# Patient Record
Sex: MALE | Race: ASIAN | NOT HISPANIC OR LATINO
[De-identification: names, ages, dates, MRNs, and addresses within clinical notes are randomized per-mention and may not be internally consistent; named-entity substitution may affect disease eponyms.]

---

## 2019-03-06 ENCOUNTER — APPOINTMENT (OUTPATIENT)
Dept: CARDIOLOGY | Facility: CLINIC | Age: 39
End: 2019-03-06
Payer: MEDICAID

## 2019-03-06 PROCEDURE — 93000 ELECTROCARDIOGRAM COMPLETE: CPT

## 2019-03-06 PROCEDURE — 99214 OFFICE O/P EST MOD 30 MIN: CPT

## 2019-03-08 ENCOUNTER — APPOINTMENT (OUTPATIENT)
Dept: CARDIOLOGY | Facility: CLINIC | Age: 39
End: 2019-03-08
Payer: MEDICAID

## 2019-03-08 DIAGNOSIS — R94.31 ABNORMAL ELECTROCARDIOGRAM [ECG] [EKG]: ICD-10-CM

## 2019-03-08 PROCEDURE — 93306 TTE W/DOPPLER COMPLETE: CPT

## 2019-03-10 NOTE — REVIEW OF SYSTEMS
[Recent Weight Gain (___ Lbs)] : recent [unfilled] ~Ulb weight gain [Dizziness] : dizziness [Negative] : Heme/Lymph

## 2019-03-10 NOTE — HISTORY OF PRESENT ILLNESS
[FreeTextEntry1] : Mr. Patten is 38 years of age with overweight White BMI, dyslipidemia, hypertension and comes today for cardiac followup now with complaints of dizziness. He will be starting antihypertensive medication more regularly shortly. He is here for checkup. No current chest pains, shortness of breath, syncope or edema.

## 2019-03-10 NOTE — PHYSICAL EXAM
[General Appearance - Well Developed] : well developed [Normal Appearance] : normal appearance [Well Groomed] : well groomed [General Appearance - Well Nourished] : well nourished [No Deformities] : no deformities [General Appearance - In No Acute Distress] : no acute distress [Normal Conjunctiva] : the conjunctiva exhibited no abnormalities [Eyelids - No Xanthelasma] : the eyelids demonstrated no xanthelasmas [Normal Oral Mucosa] : normal oral mucosa [No Oral Pallor] : no oral pallor [No Oral Cyanosis] : no oral cyanosis [Normal Jugular Venous A Waves Present] : normal jugular venous A waves present [Normal Jugular Venous V Waves Present] : normal jugular venous V waves present [No Jugular Venous Aldrich A Waves] : no jugular venous aldrich A waves [Respiration, Rhythm And Depth] : normal respiratory rhythm and effort [Exaggerated Use Of Accessory Muscles For Inspiration] : no accessory muscle use [Auscultation Breath Sounds / Voice Sounds] : lungs were clear to auscultation bilaterally [Bowel Sounds] : normal bowel sounds [Abdomen Soft] : soft [Abdomen Tenderness] : non-tender [Abnormal Walk] : normal gait [Nail Clubbing] : no clubbing of the fingernails [Cyanosis, Localized] : no localized cyanosis [Petechial Hemorrhages (___cm)] : no petechial hemorrhages [Skin Color & Pigmentation] : normal skin color and pigmentation [] : no rash [No Venous Stasis] : no venous stasis [Skin Lesions] : no skin lesions [No Skin Ulcers] : no skin ulcer [No Xanthoma] : no  xanthoma was observed [Oriented To Time, Place, And Person] : oriented to person, place, and time [Affect] : the affect was normal [Mood] : the mood was normal [Memory Recent] : recent memory was not impaired [No Anxiety] : not feeling anxious [Not Palpable] : not palpable [No Precordial Heave] : no precordial heave was noted [Normal Rate] : normal [Heart Rate ___] : [unfilled] bpm [Rhythm Regular] : regular [Normal S1] : normal S1 [Normal S2] : normal S2 [No Gallop] : no gallop heard [I] : a grade 1 [1+] : left 1+ [No Abnormalities] : the abdominal aorta was not enlarged and no bruit was heard [No Pitting Edema] : no pitting edema present [Apical Thrill] : no thrill palpable at the apex [S3] : no S3 [S4] : no S4 [Click] : no click [Pericardial Rub] : no pericardial rub [Right Carotid Bruit] : no bruit heard over the right carotid [Left Carotid Bruit] : no bruit heard over the left carotid [Right Femoral Bruit] : no bruit heard over the right femoral artery [Bruit] : no bruit heard [Rt] : no varicose veins of the right leg [Lt] : no varicose veins of the left leg

## 2019-03-10 NOTE — DISCUSSION/SUMMARY
[___ Week(s)] : [unfilled] week(s) [FreeTextEntry3] : echo and stress test [FreeTextEntry1] : He is advised to continue his metoprolol more regularly for blood pressure control. I recommended a heart healthy lifestyle including a low-saturated fat, low cholesterol diet with improved aerobic physical fitness over time for cardiovascular benefits. I've ordered an echo to look at his LV function and valve status along with a regular stress test to assess fitness and rule out provocable ECG changes. We will call him with his test results and he'll followup with you for care. Carbohydrate restriction and weight loss over time was encouraged.

## 2019-03-10 NOTE — REASON FOR VISIT
[Initial Evaluation] : an initial evaluation of [Hyperlipidemia] : hyperlipidemia [FreeTextEntry1] : overweight

## 2019-03-12 PROBLEM — R94.31 ABNORMAL ECG: Status: ACTIVE | Noted: 2019-03-06

## 2019-04-11 ENCOUNTER — APPOINTMENT (OUTPATIENT)
Dept: CARDIOLOGY | Facility: CLINIC | Age: 39
End: 2019-04-11
Payer: MEDICAID

## 2019-04-11 PROCEDURE — 93015 CV STRESS TEST SUPVJ I&R: CPT

## 2019-05-29 ENCOUNTER — APPOINTMENT (OUTPATIENT)
Dept: CARDIOLOGY | Facility: CLINIC | Age: 39
End: 2019-05-29
Payer: MEDICAID

## 2019-05-29 ENCOUNTER — NON-APPOINTMENT (OUTPATIENT)
Age: 39
End: 2019-05-29

## 2019-05-29 VITALS
OXYGEN SATURATION: 98 % | HEART RATE: 77 BPM | SYSTOLIC BLOOD PRESSURE: 120 MMHG | WEIGHT: 200 LBS | BODY MASS INDEX: 27.09 KG/M2 | DIASTOLIC BLOOD PRESSURE: 80 MMHG | HEIGHT: 72 IN

## 2019-05-29 DIAGNOSIS — E66.3 OVERWEIGHT: ICD-10-CM

## 2019-05-29 PROCEDURE — 99214 OFFICE O/P EST MOD 30 MIN: CPT

## 2019-05-29 PROCEDURE — 93000 ELECTROCARDIOGRAM COMPLETE: CPT

## 2019-05-29 NOTE — REASON FOR VISIT
[Follow-Up - Clinic] : a clinic follow-up of [Hyperlipidemia] : hyperlipidemia [FreeTextEntry1] : overweight

## 2019-05-29 NOTE — DISCUSSION/SUMMARY
[___ Week(s)] : [unfilled] week(s) [FreeTextEntry3] : echo and stress test [FreeTextEntry1] : I reviewed his recent echo and stress test findings with them which are within acceptable limits. I advised low-sodium, reduced alcohol intake as well as low-fat, low-cholesterol diet with further weight loss over time and improve aerobic fitness for heart health. He will follow up with you for care and see me in about 6 months or sooner if needed.

## 2019-05-29 NOTE — PHYSICAL EXAM
[General Appearance - Well Developed] : well developed [Normal Appearance] : normal appearance [Well Groomed] : well groomed [General Appearance - Well Nourished] : well nourished [General Appearance - In No Acute Distress] : no acute distress [No Deformities] : no deformities [Normal Conjunctiva] : the conjunctiva exhibited no abnormalities [Eyelids - No Xanthelasma] : the eyelids demonstrated no xanthelasmas [No Oral Cyanosis] : no oral cyanosis [No Oral Pallor] : no oral pallor [Normal Oral Mucosa] : normal oral mucosa [Normal Jugular Venous A Waves Present] : normal jugular venous A waves present [Normal Jugular Venous V Waves Present] : normal jugular venous V waves present [No Jugular Venous Aldrich A Waves] : no jugular venous aldrich A waves [Respiration, Rhythm And Depth] : normal respiratory rhythm and effort [Exaggerated Use Of Accessory Muscles For Inspiration] : no accessory muscle use [Abdomen Soft] : soft [Bowel Sounds] : normal bowel sounds [Auscultation Breath Sounds / Voice Sounds] : lungs were clear to auscultation bilaterally [Abdomen Tenderness] : non-tender [Abnormal Walk] : normal gait [Nail Clubbing] : no clubbing of the fingernails [Cyanosis, Localized] : no localized cyanosis [Petechial Hemorrhages (___cm)] : no petechial hemorrhages [Skin Color & Pigmentation] : normal skin color and pigmentation [] : no rash [No Venous Stasis] : no venous stasis [No Skin Ulcers] : no skin ulcer [No Xanthoma] : no  xanthoma was observed [Skin Lesions] : no skin lesions [Oriented To Time, Place, And Person] : oriented to person, place, and time [Affect] : the affect was normal [Mood] : the mood was normal [Memory Recent] : recent memory was not impaired [No Anxiety] : not feeling anxious [No Precordial Heave] : no precordial heave was noted [Not Palpable] : not palpable [Heart Rate ___] : [unfilled] bpm [Normal Rate] : normal [Normal S1] : normal S1 [Rhythm Regular] : regular [No Gallop] : no gallop heard [Normal S2] : normal S2 [No Murmur] : no murmurs heard [No Abnormalities] : the abdominal aorta was not enlarged and no bruit was heard [1+] : left 1+ [No Pitting Edema] : no pitting edema present [S4] : no S4 [Apical Thrill] : no thrill palpable at the apex [S3] : no S3 [Pericardial Rub] : no pericardial rub [Click] : no click [Right Carotid Bruit] : no bruit heard over the right carotid [Right Femoral Bruit] : no bruit heard over the right femoral artery [Left Carotid Bruit] : no bruit heard over the left carotid [Bruit] : no bruit heard [Rt] : no varicose veins of the right leg [Lt] : no varicose veins of the left leg

## 2019-05-29 NOTE — HISTORY OF PRESENT ILLNESS
[FreeTextEntry1] : Mr. Patten is 39 years of age with dyslipidemia, borderline diastolic hypertension, and overweight status by BMI follows up after recent cardiac testing feeling better, active at fitness, and losing some weight. No current chest complaints.

## 2019-07-15 ENCOUNTER — APPOINTMENT (OUTPATIENT)
Dept: CARDIOLOGY | Facility: CLINIC | Age: 39
End: 2019-07-15

## 2019-12-04 ENCOUNTER — APPOINTMENT (OUTPATIENT)
Dept: CARDIOLOGY | Facility: CLINIC | Age: 39
End: 2019-12-04

## 2020-07-31 ENCOUNTER — APPOINTMENT (OUTPATIENT)
Dept: ORTHOPEDIC SURGERY | Facility: CLINIC | Age: 40
End: 2020-07-31
Payer: MEDICAID

## 2020-07-31 VITALS — DIASTOLIC BLOOD PRESSURE: 82 MMHG | HEART RATE: 86 BPM | SYSTOLIC BLOOD PRESSURE: 130 MMHG | HEIGHT: 72 IN

## 2020-07-31 VITALS — TEMPERATURE: 98.2 F

## 2020-07-31 DIAGNOSIS — S80.12XA CONTUSION OF LEFT LOWER LEG, INITIAL ENCOUNTER: ICD-10-CM

## 2020-07-31 PROCEDURE — 99202 OFFICE O/P NEW SF 15 MIN: CPT

## 2020-09-11 ENCOUNTER — APPOINTMENT (OUTPATIENT)
Dept: ORTHOPEDIC SURGERY | Facility: CLINIC | Age: 40
End: 2020-09-11

## 2021-02-03 ENCOUNTER — APPOINTMENT (OUTPATIENT)
Dept: ORTHOPEDIC SURGERY | Facility: CLINIC | Age: 41
End: 2021-02-03
Payer: MEDICAID

## 2021-02-03 VITALS — TEMPERATURE: 97.8 F

## 2021-02-03 DIAGNOSIS — M77.11 LATERAL EPICONDYLITIS, RIGHT ELBOW: ICD-10-CM

## 2021-02-03 PROCEDURE — 20551 NJX 1 TENDON ORIGIN/INSJ: CPT

## 2021-02-03 PROCEDURE — 99072 ADDL SUPL MATRL&STAF TM PHE: CPT

## 2021-02-03 PROCEDURE — 99213 OFFICE O/P EST LOW 20 MIN: CPT | Mod: 25

## 2021-12-16 ENCOUNTER — APPOINTMENT (OUTPATIENT)
Dept: VASCULAR SURGERY | Facility: CLINIC | Age: 41
End: 2021-12-16
Payer: MEDICAID

## 2021-12-16 VITALS
WEIGHT: 183 LBS | BODY MASS INDEX: 24.25 KG/M2 | TEMPERATURE: 97.9 F | DIASTOLIC BLOOD PRESSURE: 78 MMHG | HEART RATE: 73 BPM | HEIGHT: 73 IN | SYSTOLIC BLOOD PRESSURE: 131 MMHG

## 2021-12-16 PROCEDURE — 99204 OFFICE O/P NEW MOD 45 MIN: CPT

## 2021-12-16 PROCEDURE — 93970 EXTREMITY STUDY: CPT

## 2022-03-23 ENCOUNTER — APPOINTMENT (OUTPATIENT)
Dept: VASCULAR SURGERY | Facility: CLINIC | Age: 42
End: 2022-03-23
Payer: MEDICAID

## 2022-03-23 PROCEDURE — 99441: CPT

## 2022-04-07 NOTE — HISTORY OF PRESENT ILLNESS
[FreeTextEntry1] : DOMINICK GROSSMAN is a 41 year old male with varicose veins. Since his last visit, he has been wearing compression stockings every day. He tries to elevate his legs whenever possible. However, this is difficult to do because patient is a . Despite these measures, patient still complains of bilateral leg discomfort and swelling. Patient has a strong family history of varicose veins. \par \par VLE  from 12/2021 demonstrated bilateral GSV and L SSV reflux.\par \par A/P: Patient with symptomatic venous insufficiency who has which is unresponsive to at least 3 months of compression stockings 20-30 mmHg and leg elevation. Patient has complaints of worsening leg discomfort with swelling and painful varicosities. \par \par Treatment is indicated not for cosmetic reasons but for symptomatic venous reflux disease with symptoms which is refractory to conservative therapy. Venous duplex study demonstrates reflux in the R GSV, L GSV, and L SSV. The need for definitive effective treatment is based on severe, interfering and worsening reflux symptoms with evidence of venous insufficiency on venous ultrasound. \par \par Patient is a candidate for endovenous ablation treatment of bilateral GSVs and L SSV. \par \par The risks and benefits of endovenous ablation treatment versus continued conservative management were discussed with the patient.  Patient chooses endovenous ablation treatments. Treatment plan to be scheduled. \par \par \par

## 2022-04-25 RX ORDER — ALPRAZOLAM 0.5 MG/1
0.5 TABLET ORAL
Qty: 1 | Refills: 0 | Status: COMPLETED | COMMUNITY
Start: 2022-04-25 | End: 2022-05-06

## 2022-04-25 RX ORDER — LIDOCAINE HYDROCHLORIDE 10 MG/ML
1 INJECTION, SOLUTION INFILTRATION; PERINEURAL
Qty: 50 | Refills: 0 | Status: COMPLETED | COMMUNITY
Start: 2022-04-25 | End: 2022-05-06

## 2022-04-25 RX ORDER — SODIUM BICARBONATE 84 MG/ML
8.4 INJECTION, SOLUTION INTRAVENOUS
Qty: 5 | Refills: 0 | Status: COMPLETED | COMMUNITY
Start: 2022-04-25 | End: 2022-05-06

## 2022-05-03 ENCOUNTER — NON-APPOINTMENT (OUTPATIENT)
Age: 42
End: 2022-05-03

## 2022-05-06 ENCOUNTER — APPOINTMENT (OUTPATIENT)
Dept: VASCULAR SURGERY | Facility: CLINIC | Age: 42
End: 2022-05-06

## 2022-05-09 ENCOUNTER — APPOINTMENT (OUTPATIENT)
Dept: VASCULAR SURGERY | Facility: CLINIC | Age: 42
End: 2022-05-09

## 2022-05-13 ENCOUNTER — APPOINTMENT (OUTPATIENT)
Dept: VASCULAR SURGERY | Facility: CLINIC | Age: 42
End: 2022-05-13

## 2022-05-20 ENCOUNTER — APPOINTMENT (OUTPATIENT)
Dept: VASCULAR SURGERY | Facility: CLINIC | Age: 42
End: 2022-05-20

## 2022-05-23 ENCOUNTER — APPOINTMENT (OUTPATIENT)
Dept: VASCULAR SURGERY | Facility: CLINIC | Age: 42
End: 2022-05-23

## 2022-06-17 ENCOUNTER — APPOINTMENT (OUTPATIENT)
Dept: VASCULAR SURGERY | Facility: CLINIC | Age: 42
End: 2022-06-17

## 2022-06-27 ENCOUNTER — APPOINTMENT (OUTPATIENT)
Dept: VASCULAR SURGERY | Facility: CLINIC | Age: 42
End: 2022-06-27
Payer: MEDICAID

## 2022-06-27 PROCEDURE — 93970 EXTREMITY STUDY: CPT

## 2022-07-08 ENCOUNTER — APPOINTMENT (OUTPATIENT)
Dept: VASCULAR SURGERY | Facility: CLINIC | Age: 42
End: 2022-07-08

## 2022-07-08 PROCEDURE — ZZZZZ: CPT

## 2022-07-08 NOTE — HISTORY OF PRESENT ILLNESS
[FreeTextEntry1] : DOMINICK GROSSMAN is a 42 year old male with symptomatic varicose veins. \par \par Today he reports unchanged discomfort in legs (refer to office note from March 23, 2022). V[place] procedures in September 2022. Again shows bilateral GSV and L SSV reflux. L deep reflux also noted. \par \par Patient wants to proceed with RFA procedures as discussed previously. He complains of new varicose veins "on the outside." Will possibly need stab phlebectomy procedures. Plan to re-evaluate after ablations completed. \par \par Patient agrees to the treatment plan. Will have office schedule.

## 2022-07-08 NOTE — REASON FOR VISIT
[Home] : at home, [unfilled] , at the time of the visit. [Medical Office: ([facility])___] : at the medical office located in  [Verbal consent obtained from patient] : the patient, [unfilled]

## 2022-08-25 ENCOUNTER — APPOINTMENT (OUTPATIENT)
Dept: CARDIOLOGY | Facility: CLINIC | Age: 42
End: 2022-08-25

## 2022-08-25 ENCOUNTER — NON-APPOINTMENT (OUTPATIENT)
Age: 42
End: 2022-08-25

## 2022-08-25 VITALS
DIASTOLIC BLOOD PRESSURE: 88 MMHG | SYSTOLIC BLOOD PRESSURE: 124 MMHG | WEIGHT: 189 LBS | BODY MASS INDEX: 25.05 KG/M2 | OXYGEN SATURATION: 97 % | HEIGHT: 73 IN | HEART RATE: 73 BPM

## 2022-08-25 DIAGNOSIS — R42 DIZZINESS AND GIDDINESS: ICD-10-CM

## 2022-08-25 DIAGNOSIS — E78.5 HYPERLIPIDEMIA, UNSPECIFIED: ICD-10-CM

## 2022-08-25 DIAGNOSIS — R03.0 ELEVATED BLOOD-PRESSURE READING, W/OUT DIAGNOSIS OF HYPERTENSION: ICD-10-CM

## 2022-08-25 PROCEDURE — 99203 OFFICE O/P NEW LOW 30 MIN: CPT | Mod: 25

## 2022-08-25 PROCEDURE — 93000 ELECTROCARDIOGRAM COMPLETE: CPT

## 2022-08-25 RX ORDER — METOPROLOL TARTRATE 50 MG/1
50 TABLET, FILM COATED ORAL DAILY
Qty: 90 | Refills: 2 | Status: ACTIVE | COMMUNITY

## 2022-08-25 NOTE — REASON FOR VISIT
[Follow-Up - Clinic] : a clinic follow-up of [Hyperlipidemia] : hyperlipidemia [Symptom and Test Evaluation] : symptom and test evaluation [FreeTextEntry1] : overweight

## 2022-08-25 NOTE — PHYSICAL EXAM
[Well Developed] : well developed [Well Nourished] : well nourished [No Acute Distress] : no acute distress [Normal Conjunctiva] : normal conjunctiva [Normal Venous Pressure] : normal venous pressure [Normal S1, S2] : normal S1, S2 [No Murmur] : no murmur [No Rub] : no rub [No Gallop] : no gallop [Clear Lung Fields] : clear lung fields [Good Air Entry] : good air entry [No Respiratory Distress] : no respiratory distress  [Soft] : abdomen soft [Non Tender] : non-tender [No Masses/organomegaly] : no masses/organomegaly [Normal Bowel Sounds] : normal bowel sounds [Normal Gait] : normal gait [No Edema] : no edema [No Cyanosis] : no cyanosis [No Clubbing] : no clubbing [No Varicosities] : no varicosities [No Rash] : no rash [No Skin Lesions] : no skin lesions [Moves all extremities] : moves all extremities [No Focal Deficits] : no focal deficits [Normal Speech] : normal speech [Alert and Oriented] : alert and oriented [Normal memory] : normal memory [Carotid Bruit] : carotid bruit [de-identified] : Possible left carotid bruit.

## 2022-08-25 NOTE — DISCUSSION/SUMMARY
[___ Week(s)] : in [unfilled] week(s) [FreeTextEntry1] : I am reducing metoprolol 25 mg once daily or twice daily pending her blood pressure readings.  To further cardiac risk stratify, I have ordered an echocardiogram to assess LV function and valvular status.  I will order a regular treadmill stress test to assess cardiac fitness and rule out any provocable ECG changes as well as check vital assessment to exercise.  To better assess carotid disease burden and serve as a cardiovascular risk marker, especially with the possibility of a left carotid bruit on exam, I've ordered a carotid bilateral duplex study.  I recommended a heart healthy lifestyle including a low-saturated fat, low cholesterol diet with improved aerobic physical fitness over time for cardiovascular benefits.  We will call the patient with test results and followup with you for general care.  Otherwise see me in 3 months or sooner if needed.

## 2022-08-25 NOTE — HISTORY OF PRESENT ILLNESS
[FreeTextEntry1] : He is a pleasant 42-year-old with borderline hypertension, dyslipidemia, and is seen in the office today for cardiac assessment.  He has been having dizziness and lightheadedness symptoms at times and feeling fatigued.  He also describes some shoulder and neck pain.  Eating somewhat healthy and notes that he is cutting his salt intake.  ECG today is within acceptable limits.

## 2022-08-25 NOTE — REVIEW OF SYSTEMS
[Negative] : Heme/Lymph [Joint Pain] : joint pain [Muscle Cramps] : muscle cramps [Dizziness] : dizziness

## 2022-08-31 RX ORDER — SODIUM BICARBONATE 84 MG/ML
8.4 INJECTION, SOLUTION INTRAVENOUS
Qty: 5 | Refills: 0 | Status: COMPLETED | COMMUNITY
Start: 2022-08-31 | End: 2022-09-09

## 2022-08-31 RX ORDER — ALPRAZOLAM 0.5 MG/1
0.5 TABLET ORAL
Qty: 1 | Refills: 0 | Status: COMPLETED | COMMUNITY
Start: 2022-08-31 | End: 2022-09-09

## 2022-08-31 RX ORDER — LIDOCAINE HYDROCHLORIDE 10 MG/ML
1 INJECTION, SOLUTION INFILTRATION; PERINEURAL
Qty: 50 | Refills: 0 | Status: COMPLETED | COMMUNITY
Start: 2022-08-31 | End: 2022-09-09

## 2022-09-09 ENCOUNTER — APPOINTMENT (OUTPATIENT)
Dept: VASCULAR SURGERY | Facility: CLINIC | Age: 42
End: 2022-09-09

## 2022-09-09 PROCEDURE — 36475 ENDOVENOUS RF 1ST VEIN: CPT | Mod: LT

## 2022-09-09 NOTE — PROCEDURE
[FreeTextEntry1] : left GSV RFA [FreeTextEntry3] : Procedural safety checklist and time out completed:\par Confirmed patient identification (Patient Name, , and/or medical record number including when possible affirmation by patient or parent/family/other).\par Confirmed procedure with the patient. Consent present, accurate and signed. \par Confirmed special equipment and supplies are present.\par Sterility confirmed. Position verified. \par Site/ side is marked and visible and confirmed. \par Procedure confirmed by consent. Accurate consent including side and site.\par Review of medical records, including venous ultrasound, noting correct procedure including site and side.\par MD/PA verifies presence and review of imaging studies and or written report of imaging studies.\par Agreement on the procedure to be performed\par Time out completed.\par All of the above has been confirmed by the team.\par All patient-specific concerns have been addressed. \par \par Indication: left lower extremity varicose veins with inflammation, leg pain, leg swelling, and leg cramping.  Venous insufficiency/ reflux.\par \par Procedure: radiofrequency ablation of the left great saphenous vein. \par 	\par Mr. DOMINICK GROSSMAN is a 42 year old M with a history of left lower extremity varicose veins previously seen in the office.  Ultrasound examination demonstrated venous insufficiency. A trial of compression stockings, exercise, elevation, and pain medication was attempted without relief and definitive treatment with radiofrequency ablation was offered. \par \par The patient has come for radiofrequency ablation treatment of the left great saphenous vein.\par I have discussed the risks of the procedure at length with the patient. The risks discussed were inclusive of but not limited to infection, irritation at the site of infiltration of local anesthesia, and also rare risk of deep venous thrombosis and pulmonary emboli. The patient agrees to proceed with the procedure. \par The patient was escorted into the procedure room and a time out called.\par The entire limb was prepped and draped in sterile fashion. The RF fiber was placed on the sterile field and connected by a sterile cable. Actuation, temperature, and impedance testing were performed to ensure that all components were connected and operating properly. \par The patient was placed on the procedure table and local anesthesia was instilled in the skin overlying the access site. Under ultrasound guidance, the vein was punctured with a micropuncture needle, using the anterior wall technique. A guide wire was now introduced through the needle, and the needle was then exchanged over the guide wire for a 7F sheath. The guide wire was removed and the RF probe was then placed into the left great saphenous vein through the sheath and position confirmed using ultrasound guidance. After the RF probe position was verified by ultrasound, tumescent anesthesia consisting of normal saline, 1% lidocaine with 8.4% sodium bicarbonate was infiltrated, under ultrasound guidance, precisely into the perivenous compartment along the entire length of the vein until a “halo” of fluid was noted around the vein. After RF probe position was again confirmed with ultrasound imaging, RF energy was applied. The probe was gradually and carefully withdrawn at a rate of 6.5cm/20seconds. \par \par 8 cycles of RF performed using the _7 cm probe\par Total treatment time was 160 seconds.\par The total volume injected was 400 cc\par Treatment length was 46 cm and \par The probe is 3.7 cm from the SFJ.\par \par Estimated Blood Loss: minimal\par Repeat ultrasound of the treated vein was performed confirming successful treatment. The catheter and sheath were withdrawn and hemostasis established with direct pressure. After assuring hemostasis, a sterile 4x4 was placed on the access site and an ACE compression wrap was applied. Patient tolerated procedure well. Patient was given post-procedure instructions and follow up appointment was scheduled.\par \par \par

## 2022-09-12 RX ORDER — LIDOCAINE HYDROCHLORIDE 10 MG/ML
1 INJECTION, SOLUTION INFILTRATION; PERINEURAL
Qty: 50 | Refills: 0 | Status: COMPLETED | COMMUNITY
Start: 2022-09-12 | End: 2022-09-23

## 2022-09-12 RX ORDER — ALPRAZOLAM 0.5 MG/1
0.5 TABLET ORAL
Qty: 1 | Refills: 0 | Status: COMPLETED | COMMUNITY
Start: 2022-09-07 | End: 2022-09-12

## 2022-09-12 RX ORDER — ALPRAZOLAM 0.5 MG/1
0.5 TABLET ORAL
Qty: 1 | Refills: 0 | Status: COMPLETED | COMMUNITY
Start: 2022-09-12 | End: 2022-09-23

## 2022-09-12 RX ORDER — SODIUM BICARBONATE 84 MG/ML
8.4 INJECTION, SOLUTION INTRAVENOUS
Qty: 5 | Refills: 0 | Status: COMPLETED | COMMUNITY
Start: 2022-09-12 | End: 2022-09-23

## 2022-09-12 RX ORDER — LIDOCAINE HYDROCHLORIDE 10 MG/ML
1 INJECTION, SOLUTION INFILTRATION; PERINEURAL
Qty: 50 | Refills: 0 | Status: COMPLETED | COMMUNITY
Start: 2022-09-07 | End: 2022-09-12

## 2022-09-12 RX ORDER — SODIUM BICARBONATE 84 MG/ML
8.4 INJECTION, SOLUTION INTRAVENOUS
Qty: 5 | Refills: 0 | Status: COMPLETED | COMMUNITY
Start: 2022-09-07 | End: 2022-09-12

## 2022-09-16 ENCOUNTER — APPOINTMENT (OUTPATIENT)
Dept: VASCULAR SURGERY | Facility: CLINIC | Age: 42
End: 2022-09-16

## 2022-09-16 PROCEDURE — 93971 EXTREMITY STUDY: CPT | Mod: 59

## 2022-09-16 PROCEDURE — 36475 ENDOVENOUS RF 1ST VEIN: CPT | Mod: LT

## 2022-09-16 NOTE — PROCEDURE
[FreeTextEntry1] : left SSV RFA [FreeTextEntry3] : Procedural safety checklist and time out completed:\par Confirmed patient identification (Patient Name, , and/or medical record number including when possible affirmation by patient or parent/family/other).\par Confirmed procedure with the patient. Consent present, accurate and signed. \par Confirmed special equipment and supplies are present.\par Sterility confirmed. Position verified. \par Site/ side is marked and visible and confirmed. \par Procedure confirmed by consent. Accurate consent including side and site.\par Review of medical records, including venous ultrasound, noting correct procedure including site and side.\par MD/PA verifies presence and review of imaging studies and or written report of imaging studies.\par Agreement on the procedure to be performed\par Time out completed.\par All of the above has been confirmed by the team.\par All patient-specific concerns have been addressed. \par \par Indication: left lower extremity varicose veins with inflammation, leg pain, leg swelling, and leg cramping.  Venous insufficiency/ reflux.\par \par Procedure: radiofrequency ablation of the left small saphenous vein. \par 	\par Mr. DOMINICK GROSSMAN is a 42 year old M with a history of left lower extremity varicose veins previously seen in the office.  Ultrasound examination demonstrated venous insufficiency. A trial of compression stockings, exercise, elevation, and pain medication was attempted without relief and definitive treatment with radiofrequency ablation was offered. \par \par The patient has come for radiofrequency ablation treatment of the left small saphenous vein.\par I have discussed the risks of the procedure at length with the patient. The risks discussed were inclusive of but not limited to infection, irritation at the site of infiltration of local anesthesia, and also rare risk of deep venous thrombosis and pulmonary emboli. The patient agrees to proceed with the procedure. \par The patient was escorted into the procedure room and a time out called.\par The entire limb was prepped and draped in sterile fashion. The RF fiber was placed on the sterile field and connected by a sterile cable. Actuation, temperature, and impedance testing were performed to ensure that all components were connected and operating properly. \par The patient was placed on the procedure table and local anesthesia was instilled in the skin overlying the access site. Under ultrasound guidance, the vein was punctured with a micropuncture needle, using the anterior wall technique. A guide wire was now introduced through the needle, and the needle was then exchanged over the guide wire for a 7F sheath. The guide wire was removed and the RF probe was then placed into the left small saphenous vein through the sheath and position confirmed using ultrasound guidance. After the RF probe position was verified by ultrasound, tumescent anesthesia consisting of normal saline, 1% lidocaine with 8.4% sodium bicarbonate was infiltrated, under ultrasound guidance, precisely into the perivenous compartment along the entire length of the vein until a “halo” of fluid was noted around the vein. After RF probe position was again confirmed with ultrasound imaging, RF energy was applied. The probe was gradually and carefully withdrawn at a rate of 6.5cm/20seconds. \par \par 3 cycles of RF performed using the 7 cm probe\par Total treatment time was 60 seconds.\par The total volume injected was 250 cc\par Treatment length was 13 cm and \par The probe is 3.6 cm from the SPJ.\par \par Estimated Blood Loss: minimal\par Repeat ultrasound of the treated vein was performed confirming successful treatment. The catheter and sheath were withdrawn and hemostasis established with direct pressure. After assuring hemostasis, a sterile 4x4 was placed on the access site and an ACE compression wrap was applied. Patient tolerated procedure well. Patient was given post-procedure instructions and follow up appointment was scheduled.\par \par \par

## 2022-09-19 ENCOUNTER — APPOINTMENT (OUTPATIENT)
Dept: CARDIOLOGY | Facility: CLINIC | Age: 42
End: 2022-09-19

## 2022-09-19 ENCOUNTER — APPOINTMENT (OUTPATIENT)
Dept: VASCULAR SURGERY | Facility: CLINIC | Age: 42
End: 2022-09-19

## 2022-09-19 PROCEDURE — 93971 EXTREMITY STUDY: CPT

## 2022-09-22 ENCOUNTER — APPOINTMENT (OUTPATIENT)
Dept: CARDIOLOGY | Facility: CLINIC | Age: 42
End: 2022-09-22

## 2022-10-07 ENCOUNTER — NON-APPOINTMENT (OUTPATIENT)
Age: 42
End: 2022-10-07

## 2022-10-13 ENCOUNTER — APPOINTMENT (OUTPATIENT)
Dept: VASCULAR SURGERY | Facility: CLINIC | Age: 42
End: 2022-10-13

## 2022-10-13 VITALS — DIASTOLIC BLOOD PRESSURE: 86 MMHG | HEART RATE: 62 BPM | SYSTOLIC BLOOD PRESSURE: 137 MMHG

## 2022-10-13 VITALS
TEMPERATURE: 98.2 F | SYSTOLIC BLOOD PRESSURE: 139 MMHG | HEART RATE: 62 BPM | BODY MASS INDEX: 25.18 KG/M2 | DIASTOLIC BLOOD PRESSURE: 92 MMHG | HEIGHT: 73 IN | WEIGHT: 190 LBS

## 2022-10-13 PROCEDURE — 99212 OFFICE O/P EST SF 10 MIN: CPT

## 2022-10-13 PROCEDURE — 93971 EXTREMITY STUDY: CPT | Mod: LT

## 2022-10-13 RX ORDER — IBUPROFEN 600 MG/1
600 TABLET, FILM COATED ORAL 3 TIMES DAILY
Qty: 12 | Refills: 0 | Status: ACTIVE | COMMUNITY
Start: 2022-10-13 | End: 1900-01-01

## 2022-10-25 ENCOUNTER — APPOINTMENT (OUTPATIENT)
Dept: CARDIOLOGY | Facility: CLINIC | Age: 42
End: 2022-10-25

## 2022-10-31 RX ORDER — ALPRAZOLAM 0.5 MG/1
0.5 TABLET ORAL
Qty: 1 | Refills: 0 | Status: COMPLETED | COMMUNITY
Start: 2022-10-31 | End: 2022-11-11

## 2022-10-31 RX ORDER — LIDOCAINE HYDROCHLORIDE 10 MG/ML
1 INJECTION, SOLUTION INFILTRATION; PERINEURAL
Qty: 50 | Refills: 0 | Status: COMPLETED | COMMUNITY
Start: 2022-10-31 | End: 2022-11-11

## 2022-10-31 RX ORDER — SODIUM BICARBONATE 84 MG/ML
8.4 INJECTION, SOLUTION INTRAVENOUS
Qty: 5 | Refills: 0 | Status: COMPLETED | COMMUNITY
Start: 2022-10-31 | End: 2022-11-11

## 2022-11-11 ENCOUNTER — APPOINTMENT (OUTPATIENT)
Dept: VASCULAR SURGERY | Facility: CLINIC | Age: 42
End: 2022-11-11

## 2022-11-11 PROCEDURE — 99212 OFFICE O/P EST SF 10 MIN: CPT

## 2022-11-11 NOTE — ASSESSMENT
[FreeTextEntry1] : Patient with LLE SVT s/p vein ablations. Patient reassured as such SVTs can sometimes be expected after ablation procedures. Recommend warm compress to the area 3-4x time, NSAIDs prn (script sent), leg elevation, and compression stockings. Symptoms will resolve with time.\par \par He will keep the 11/11 date for R GSV RFA. If LLE symptoms not improved by that time, will postpone any additional vein procedures. \par \par We also talked about the possibility of LLE stab phlebectomy, however, will hold off until symptoms from current SVT resolves completely. \par \par All questions/concerns addressed. Patient advised to call back if any worsening in symptoms.

## 2022-11-11 NOTE — HISTORY OF PRESENT ILLNESS
[FreeTextEntry1] : DOMINICK GROSSMAN (: 1980) is a 42 year old male who presents today for leg pain. \par \par He is recently s/p L GSV/SSV RFA procedures from 2022. Pending R GSV RFA.\par \par Patient reports worsening pain in the L calf which started about 10 days after his second ablation procedure (L SSV RFA). Denies trauma, fever, chills, chest pain, shortness of breath. Has been applying warm compress to the area once a daily. Not taking any pain medications. \par \par VLE today is negative for DVT. L SSV is closed. Acute occlusive SVT in p/m calf GSV as well as tributaries in the posteromedial calf.

## 2022-11-11 NOTE — PHYSICAL EXAM
[2+] : left 2+ [Calm] : calm [Skin Ulcer] : no ulcer [de-identified] : NAD [de-identified] : unlabored  [FreeTextEntry1] : L calf with small area of erythema. Multiple VV also noted in the L calf with associated warmth and tenderness.

## 2022-11-14 ENCOUNTER — APPOINTMENT (OUTPATIENT)
Dept: VASCULAR SURGERY | Facility: CLINIC | Age: 42
End: 2022-11-14

## 2022-11-28 NOTE — PHYSICAL EXAM
[2+] : left 2+ [Skin Ulcer] : no ulcer [Calm] : calm [de-identified] : NAD [de-identified] : unlabored  [FreeTextEntry1] : LLL no erythema, edema  ecchymosis or tenderness. Multiple VV also noted in the L calf that are hard and nontender to palpation.

## 2022-11-28 NOTE — ASSESSMENT
[FreeTextEntry1] : DOMINICK GROSSMAN (: 1980) is a 42 year old male who was scheduled today for a right leg GSV RFA. He reports he still has intermittent pain in the left leg that is continue to improve. Leg pain after ablation was mostly due to acute SVT of thee tributary veins. Sx improved with warm compress to the area and time. He did not take NSAIDs.\par \par He is s/p L GSV/SSV RFA procedures from 2022. Pending R GSV RFA.\par \par He does not want to do his right leg procedure until he is symptom free on his left leg. \par We discussed the possibility of LLE stab phlebectomy, however, will hold off until symptoms from current SVT resolves completely. \par \par All questions/concerns addressed. Patient advised to call back if any worsening in symptoms.

## 2022-11-28 NOTE — HISTORY OF PRESENT ILLNESS
[FreeTextEntry1] : DOMINICK GROSSMAN (: 1980) is a 42 year old male who was scheduled today for a right leg GSV RFA. He reports he still has intermittent pain in the left leg that is continue to improve. Leg pain after ablation was mostly due to acute SVT of thee tributary veins. Sx improved with warm compress to the area and time. He did not take NSAIDs.\par \par He is s/p L GSV/SSV RFA procedures from 2022. Pending R GSV RFA.\par \par Denies trauma, fever, chills, chest pain, shortness of breath. \par \par He does not want o do his right leg procedure until he is symptom free on his left leg.

## 2022-11-28 NOTE — PHYSICAL EXAM
[2+] : left 2+ [Skin Ulcer] : no ulcer [Calm] : calm [de-identified] : NAD [de-identified] : unlabored  [FreeTextEntry1] : LLL no erythema, edema  ecchymosis or tenderness. Multiple VV also noted in the L calf that are hard and nontender to palpation.

## 2022-11-29 ENCOUNTER — APPOINTMENT (OUTPATIENT)
Dept: CARDIOLOGY | Facility: CLINIC | Age: 42
End: 2022-11-29

## 2022-12-09 ENCOUNTER — APPOINTMENT (OUTPATIENT)
Dept: VASCULAR SURGERY | Facility: CLINIC | Age: 42
End: 2022-12-09

## 2022-12-09 DIAGNOSIS — I83.893 VARICOSE VEINS OF BILATERAL LOWER EXTREMITIES WITH OTHER COMPLICATIONS: ICD-10-CM

## 2022-12-09 PROCEDURE — 99441: CPT

## 2022-12-09 NOTE — HISTORY OF PRESENT ILLNESS
[FreeTextEntry1] : DOMINICK GROSSMAN (: 1980) is a 42 year old male with symptomatic varicose veins. \par \par He is s/p L GSV/SSV RFA (2022). Post-treatment he had a symptomatic L calf SVT. Today he reports significant improvement in symptoms in the L leg. \par \par We discussed pursuing R GSV RFA. Patient would like some time to consider further treatments. He is also interested in LLE stab phlebectomy.\par \par Patient was advised to call the office once he has made a  decision to have additional vein procedures.

## 2023-12-04 ENCOUNTER — RX RENEWAL (OUTPATIENT)
Age: 43
End: 2023-12-04

## 2024-01-02 ENCOUNTER — RX RENEWAL (OUTPATIENT)
Age: 44
End: 2024-01-02

## 2024-01-02 RX ORDER — METOPROLOL TARTRATE 25 MG/1
25 TABLET, FILM COATED ORAL
Qty: 60 | Refills: 0 | Status: ACTIVE | COMMUNITY
Start: 2022-08-25 | End: 1900-01-01